# Patient Record
Sex: FEMALE | Race: WHITE | NOT HISPANIC OR LATINO | URBAN - METROPOLITAN AREA
[De-identification: names, ages, dates, MRNs, and addresses within clinical notes are randomized per-mention and may not be internally consistent; named-entity substitution may affect disease eponyms.]

---

## 2019-08-15 PROBLEM — Z00.00 ENCOUNTER FOR PREVENTIVE HEALTH EXAMINATION: Status: ACTIVE | Noted: 2019-08-15

## 2019-08-21 ENCOUNTER — OUTPATIENT (OUTPATIENT)
Dept: OUTPATIENT SERVICES | Facility: HOSPITAL | Age: 29
LOS: 1 days | Discharge: HOME | End: 2019-08-21

## 2019-08-21 ENCOUNTER — APPOINTMENT (OUTPATIENT)
Dept: ANTEPARTUM | Facility: CLINIC | Age: 29
End: 2019-08-21
Payer: MEDICAID

## 2019-08-21 ENCOUNTER — APPOINTMENT (OUTPATIENT)
Dept: OBGYN | Facility: CLINIC | Age: 29
End: 2019-08-21
Payer: MEDICAID

## 2019-08-21 ENCOUNTER — NON-APPOINTMENT (OUTPATIENT)
Age: 29
End: 2019-08-21

## 2019-08-21 ENCOUNTER — RESULT CHARGE (OUTPATIENT)
Age: 29
End: 2019-08-21

## 2019-08-21 VITALS
WEIGHT: 176 LBS | BODY MASS INDEX: 27.95 KG/M2 | SYSTOLIC BLOOD PRESSURE: 110 MMHG | DIASTOLIC BLOOD PRESSURE: 76 MMHG | HEIGHT: 66.54 IN

## 2019-08-21 DIAGNOSIS — O40.9XX0 POLYHYDRAMNIOS, UNSPECIFIED TRIMESTER, NOT APPLICABLE OR UNSPECIFIED: ICD-10-CM

## 2019-08-21 DIAGNOSIS — O36.60X0 MATERNAL CARE FOR EXCESSIVE FETAL GROWTH, UNSPECIFIED TRIMESTER, NOT APPLICABLE OR UNSPECIFIED: ICD-10-CM

## 2019-08-21 DIAGNOSIS — Z34.90 ENCOUNTER FOR SUPERVISION OF NORMAL PREGNANCY, UNSPECIFIED, UNSPECIFIED TRIMESTER: ICD-10-CM

## 2019-08-21 LAB
BILIRUB UR QL STRIP: NEGATIVE
CLARITY UR: CLEAR
COLLECTION METHOD: NORMAL
GLUCOSE UR-MCNC: NEGATIVE
HCG UR QL: NEGATIVE EU/DL
HGB UR QL STRIP.AUTO: NEGATIVE
KETONES UR-MCNC: NEGATIVE
LEUKOCYTE ESTERASE UR QL STRIP: NEGATIVE
NITRITE UR QL STRIP: NEGATIVE
PH UR STRIP: 6.5
PROT UR STRIP-MCNC: NORMAL
SP GR UR STRIP: 1.01

## 2019-08-21 PROCEDURE — 76816 OB US FOLLOW-UP PER FETUS: CPT | Mod: 26

## 2019-08-21 PROCEDURE — 99204 OFFICE O/P NEW MOD 45 MIN: CPT

## 2019-08-21 PROCEDURE — 99212 OFFICE O/P EST SF 10 MIN: CPT | Mod: 25

## 2019-08-21 NOTE — DISCUSSION/SUMMARY
[FreeTextEntry1] : Late registrant with a large baby and polyhydramnios at term. Delivery is advised.

## 2019-08-22 ENCOUNTER — INPATIENT (INPATIENT)
Facility: HOSPITAL | Age: 29
LOS: 2 days | Discharge: HOME | End: 2019-08-25
Attending: OBSTETRICS & GYNECOLOGY | Admitting: OBSTETRICS & GYNECOLOGY
Payer: MEDICAID

## 2019-08-22 VITALS — TEMPERATURE: 99 F

## 2019-08-22 DIAGNOSIS — Z3A.39 39 WEEKS GESTATION OF PREGNANCY: ICD-10-CM

## 2019-08-22 DIAGNOSIS — Z36.89 ENCOUNTER FOR OTHER SPECIFIED ANTENATAL SCREENING: ICD-10-CM

## 2019-08-22 DIAGNOSIS — O36.60X0 MATERNAL CARE FOR EXCESSIVE FETAL GROWTH, UNSPECIFIED TRIMESTER, NOT APPLICABLE OR UNSPECIFIED: ICD-10-CM

## 2019-08-22 DIAGNOSIS — Z34.90 ENCOUNTER FOR SUPERVISION OF NORMAL PREGNANCY, UNSPECIFIED, UNSPECIFIED TRIMESTER: ICD-10-CM

## 2019-08-22 DIAGNOSIS — O40.9XX0 POLYHYDRAMNIOS, UNSPECIFIED TRIMESTER, NOT APPLICABLE OR UNSPECIFIED: ICD-10-CM

## 2019-08-22 LAB
AMPHET UR-MCNC: NEGATIVE — SIGNIFICANT CHANGE UP
APPEARANCE UR: ABNORMAL
BACTERIA # UR AUTO: ABNORMAL
BARBITURATES UR SCN-MCNC: NEGATIVE — SIGNIFICANT CHANGE UP
BASOPHILS # BLD AUTO: 0.04 K/UL — SIGNIFICANT CHANGE UP (ref 0–0.2)
BASOPHILS NFR BLD AUTO: 0.3 % — SIGNIFICANT CHANGE UP (ref 0–1)
BENZODIAZ UR-MCNC: NEGATIVE — SIGNIFICANT CHANGE UP
BILIRUB UR-MCNC: NEGATIVE — SIGNIFICANT CHANGE UP
BLD GP AB SCN SERPL QL: SIGNIFICANT CHANGE UP
BUPRENORPHINE SCREEN, URINE RESULT: NEGATIVE — SIGNIFICANT CHANGE UP
C TRACH RRNA SPEC QL NAA+PROBE: NOT DETECTED
COCAINE METAB.OTHER UR-MCNC: NEGATIVE — SIGNIFICANT CHANGE UP
COLOR SPEC: YELLOW — SIGNIFICANT CHANGE UP
DIFF PNL FLD: NEGATIVE — SIGNIFICANT CHANGE UP
EOSINOPHIL # BLD AUTO: 0.02 K/UL — SIGNIFICANT CHANGE UP (ref 0–0.7)
EOSINOPHIL NFR BLD AUTO: 0.2 % — SIGNIFICANT CHANGE UP (ref 0–8)
EPI CELLS # UR: >27 /HPF — HIGH (ref 0–5)
ESTIMATED AVERAGE GLUCOSE: 103 MG/DL — SIGNIFICANT CHANGE UP (ref 68–114)
GLUCOSE BLDC GLUCOMTR-MCNC: 119 MG/DL — HIGH (ref 70–99)
GLUCOSE UR QL: NEGATIVE — SIGNIFICANT CHANGE UP
HBA1C BLD-MCNC: 5.2 % — SIGNIFICANT CHANGE UP (ref 4–5.6)
HCT VFR BLD CALC: 32.3 % — LOW (ref 37–47)
HGB BLD-MCNC: 10.1 G/DL — LOW (ref 12–16)
HYALINE CASTS # UR AUTO: 17 /LPF — HIGH (ref 0–7)
IMM GRANULOCYTES NFR BLD AUTO: 0.8 % — HIGH (ref 0.1–0.3)
KETONES UR-MCNC: SIGNIFICANT CHANGE UP
L&D DRUG SCREEN, URINE: SIGNIFICANT CHANGE UP
LEUKOCYTE ESTERASE UR-ACNC: ABNORMAL
LYMPHOCYTES # BLD AUTO: 2.89 K/UL — SIGNIFICANT CHANGE UP (ref 1.2–3.4)
LYMPHOCYTES # BLD AUTO: 21.8 % — SIGNIFICANT CHANGE UP (ref 20.5–51.1)
MCHC RBC-ENTMCNC: 22.7 PG — LOW (ref 27–31)
MCHC RBC-ENTMCNC: 31.3 G/DL — LOW (ref 32–37)
MCV RBC AUTO: 72.7 FL — LOW (ref 81–99)
METHADONE UR-MCNC: NEGATIVE — SIGNIFICANT CHANGE UP
MONOCYTES # BLD AUTO: 0.74 K/UL — HIGH (ref 0.1–0.6)
MONOCYTES NFR BLD AUTO: 5.6 % — SIGNIFICANT CHANGE UP (ref 1.7–9.3)
N GONORRHOEA RRNA SPEC QL NAA+PROBE: NOT DETECTED
NEUTROPHILS # BLD AUTO: 9.45 K/UL — HIGH (ref 1.4–6.5)
NEUTROPHILS NFR BLD AUTO: 71.3 % — SIGNIFICANT CHANGE UP (ref 42.2–75.2)
NITRITE UR-MCNC: NEGATIVE — SIGNIFICANT CHANGE UP
NRBC # BLD: 0 /100 WBCS — SIGNIFICANT CHANGE UP (ref 0–0)
OPIATES UR-MCNC: NEGATIVE — SIGNIFICANT CHANGE UP
OXYCODONE UR-MCNC: NEGATIVE — SIGNIFICANT CHANGE UP
PCP UR-MCNC: NEGATIVE — SIGNIFICANT CHANGE UP
PH UR: 6 — SIGNIFICANT CHANGE UP (ref 5–8)
PLATELET # BLD AUTO: 311 K/UL — SIGNIFICANT CHANGE UP (ref 130–400)
PRENATAL SYPHILIS TEST: SIGNIFICANT CHANGE UP
PROPOXYPHENE QUALITATIVE URINE RESULT: NEGATIVE — SIGNIFICANT CHANGE UP
PROT UR-MCNC: ABNORMAL
RBC # BLD: 4.44 M/UL — SIGNIFICANT CHANGE UP (ref 4.2–5.4)
RBC # FLD: 18.7 % — HIGH (ref 11.5–14.5)
RBC CASTS # UR COMP ASSIST: 3 /HPF — SIGNIFICANT CHANGE UP (ref 0–4)
SOURCE AMPLIFICATION: NORMAL
SP GR SPEC: 1.02 — SIGNIFICANT CHANGE UP (ref 1.01–1.02)
UROBILINOGEN FLD QL: SIGNIFICANT CHANGE UP
WBC # BLD: 13.24 K/UL — HIGH (ref 4.8–10.8)
WBC # FLD AUTO: 13.24 K/UL — HIGH (ref 4.8–10.8)
WBC UR QL: 14 /HPF — HIGH (ref 0–5)

## 2019-08-22 RX ORDER — OXYTOCIN 10 UNIT/ML
41.67 VIAL (ML) INJECTION
Qty: 20 | Refills: 0 | Status: DISCONTINUED | OUTPATIENT
Start: 2019-08-22 | End: 2019-08-25

## 2019-08-22 RX ORDER — SODIUM CHLORIDE 9 MG/ML
1000 INJECTION, SOLUTION INTRAVENOUS
Refills: 0 | Status: DISCONTINUED | OUTPATIENT
Start: 2019-08-22 | End: 2019-08-23

## 2019-08-22 RX ORDER — OXYTOCIN 10 UNIT/ML
2 VIAL (ML) INJECTION
Qty: 30 | Refills: 0 | Status: DISCONTINUED | OUTPATIENT
Start: 2019-08-22 | End: 2019-08-25

## 2019-08-22 RX ADMIN — Medication 2 MILLIUNIT(S)/MIN: at 16:32

## 2019-08-22 NOTE — PROGRESS NOTE ADULT - ASSESSMENT
A/P:   30yo  @40wks, GBS unknown, late transfer from [place], IOL for polyhydramnios and macrosomia, for induction of labor, on pitocin.  -continue pitocin  -pain management prn  -cont efm/toco  -f/u pending labs (varicella, rubella, rubeola, quantiferon, HIV, HepBsAg)  -cont to monitor vitals  -cont iv hydration    Will make Dr. Rae and Dr. Gallo aware.

## 2019-08-22 NOTE — OB PROVIDER H&P - NS_OBGYNHISTORY_OBGYN_ALL_OB_FT
OB Hx   FT , female 3450g, no complications    GYN Hx  No Hx of ovarian cysts, fibroids, STIs, abnormal papsmears.

## 2019-08-22 NOTE — OB PROVIDER H&P - HISTORY OF PRESENT ILLNESS
28yo  @40wks, ROQUE 2019 by LMP, late transfer from [place]. Patient was seen in office for the first time yesterday, official sonogram indicated polyhydramnios (MVP 112mm). Patient denies CTX, LOF, VB. Reports good FM. Denies complications during this pregnancy. Pelvic exam yesterday in office was /-3. 30yo  @40wks, ROQUE 2019 by LMP, late transfer from [place]. She moved 1 month ago, reports compliance with prenatal care. Patient was seen in office for the first time yesterday, official sonogram indicated polyhydramnios (MVP 112mm). Patient denies CTX, LOF, VB. Reports good FM. Denies fever, chills, n/v, constipation, diarrhea, dysuria. Denies complications during this pregnancy. Pelvic exam yesterday in office was /-3. 30yo  @40wks, ROQUE 2019 by LMP, late transfer from [place]. She moved 1 month ago, reports compliance with prenatal care. Patient was seen in office for the first time yesterday, official sonogram indicated polyhydramnios (MVP 112mm) and macrosomia. Patient denies CTX, LOF, VB. Reports good FM. Denies fever, chills, n/v, constipation, diarrhea, dysuria. Denies complications during this pregnancy. Pelvic exam yesterday in office was /-3.

## 2019-08-22 NOTE — OB PROVIDER H&P - NSHPPHYSICALEXAM_GEN_ALL_CORE
Vital Signs Last 24 Hrs  T(C): 37 (22 Aug 2019 15:18), Max: 37 (22 Aug 2019 15:00)  T(F): 98.6 (22 Aug 2019 15:18), Max: 98.6 (22 Aug 2019 15:00)  HR: 100 (22 Aug 2019 15:18) (100 - 100)  BP: 105/71 (22 Aug 2019 15:18) (105/71 - 105/71)    FS: 119 mg/dL  Udip  EFM: 140/mod maria del carmen/+accels  [place]: q3 mins  Gen: NAD  Heart:   Lungs:   Abd: soft, nontender, gravid, palpable contractions  SVE:   Sono: Vital Signs Last 24 Hrs  T(C): 37 (22 Aug 2019 15:18), Max: 37 (22 Aug 2019 15:00)  T(F): 98.6 (22 Aug 2019 15:18), Max: 98.6 (22 Aug 2019 15:00)  HR: 100 (22 Aug 2019 15:18) (100 - 100)  BP: 105/71 (22 Aug 2019 15:18) (105/71 - 105/71)    FS: 119 mg/dL  Udip  EFM: 140/mod maria [place] carmen/+accels  [place]: q3 mins  Gen: NAD  Heart: S1S2, RRR  Lungs: CTAB  Abd: soft, nontender, gravid, palpable contractions  SVE:   Sono: post placenta, cephalic Vital Signs Last 24 Hrs  T(C): 37 (22 Aug 2019 15:18), Max: 37 (22 Aug 2019 15:00)  T(F): 98.6 (22 Aug 2019 15:18), Max: 98.6 (22 Aug 2019 15:00)  HR: 100 (22 Aug 2019 15:18) (100 - 100)  BP: 105/71 (22 Aug 2019 15:18) (105/71 - 105/71)    FS: 119 mg/dL    EFM: 140/mod maria del carmen/+accels  [place]: q3 mins  Gen: NAD  Heart: S1S2, RRR  Lungs: CTAB  Abd: soft, nontender, gravid, palpable contractions  SVE:   Sono: post placenta, cephalic Vital Signs Last 24 Hrs  T(C): 37 (22 Aug 2019 15:18), Max: 37 (22 Aug 2019 15:00)  T(F): 98.6 (22 Aug 2019 15:18), Max: 98.6 (22 Aug 2019 15:00)  HR: 100 (22 Aug 2019 15:18) (100 - 100)  BP: 105/71 (22 Aug 2019 15:18) (105/71 - 105/71)    FS: 119 mg/dL  Udip: trace ket, 30 protein, mod leuks  EFM: 140/mod maria [place] carmen/+accels  [place]: q3 mins  Gen: NAD  Heart: S1S2, RRR  Lungs: CTAB  Abd: soft, nontender, gravid, palpable contractions  SVE: 1-2/50/-2 by Dr. Hwang  Sono: post placenta, cephalic

## 2019-08-22 NOTE — PROGRESS NOTE ADULT - SUBJECTIVE AND OBJECTIVE BOX
OBGYN PGY3 Note:     Patient seen and examined at bedside. Comfortable, denies complaints.      T(C): 36.9 (19 @ 15:29), Max: 37 (19 @ 15:00)  HR: 88 (19 @ 23:36) (58 - 100)  BP: 96/50 (19 @ 23:36) (81/50 - 112/78)  RR: 18 (19 @ 23:29) (18 - 18)  SpO2: 99% (19 @ 23:36) (99% - 99%)    EFM: 135/mod/accels+  [place]: q2min  SVE: 3-4/50/-2, AROM, clear     Meds: lactated ringers. 1000 milliLiter(s) IV Continuous <Continuous>  oxytocin Infusion 2 milliUNIT(s)/Min IV Continuous <Continuous> started at 2330, currently at 12mu/min      Labs: HbA1c 5.2; HbsAg, HIV, quantiferon TB, rubella/rubeola/varicella pending                       10.1   13.24 )-----------( 311      ( 22 Aug 2019 16:04 )             32.3         Urinalysis Basic - ( 22 Aug 2019 16:04 )    Color: Yellow / Appearance: Slightly Turbid / S.020 / pH: x  Gluc: x / Ketone: Trace  / Bili: Negative / Urobili: <2 mg/dL   Blood: x / Protein: 30 mg/dL / Nitrite: Negative   Leuk Esterase: Moderate / RBC: 3 /HPF / WBC 14 /HPF   Sq Epi: x / Non Sq Epi: >27 /HPF / Bacteria: Moderate         Prenatal Syphilis Test: Nonreact (19 @ 16:04)  ABO RH Interpretation: B POS (19 @ 16:04)

## 2019-08-22 NOTE — CHART NOTE - NSCHARTNOTEFT_GEN_A_CORE
Thursday 08/22/2019  21:26    Patient's chart, notes and labs reviewed.  IOL with Oxytocin.                        10.1   13.24 )-----------( 311      ( 22 Aug 2019 16:04 )             32.3   Lab results acceptable for consideration of Neuraxial Nerve Block for Labor Analgesia/Anesthesia.  Pt has not requested for any Labor Analgesia as of this note.    Will remain immediately available.

## 2019-08-22 NOTE — PROGRESS NOTE ADULT - ASSESSMENT
28yo  at 40w GA, GBS unknown, late transfer from [place], IOL for polyhydramnios on pitocin   - continuous EFM/toco  - clear liquids  - pain mgmt prn   - IV hydration  - c/w pit  - anticipate    - f/u pending labs    Dr. Gallo aware.

## 2019-08-22 NOTE — OB PROVIDER H&P - ASSESSMENT
28yo  @40wks, GBS unknown, late transfer from [place], IOL for polyhydramnios, in early labor    -admit to L&D  -admission labs  -continuous EFM/toco  -pitocin for induction  -monitor vitals  -pain management PRN  -anticipate      and Dr. Gallo to be made aware 30yo  @40wks, GBS unknown, late transfer from [place], IOL for polyhydramnios and macrosomia, for induction of labor    -admit to L&D  -admission labs  -continuous EFM/toco  -pitocin for induction  -monitor vitals  -pain management PRN  -anticipate      and Dr. Gallo to be made aware

## 2019-08-22 NOTE — PROGRESS NOTE ADULT - SUBJECTIVE AND OBJECTIVE BOX
PGY1 Note    Patient seen at bedside for evaluation of labor progression, doing well, no complaints.     T(F): 98.5 (19 @ 15:29), Max: 98.6 (19 @ 15:00)  HR: 58 (19 @ 20:33) (58 - 100)  BP: 83/52 (19 @ 20:33) (81/50 - 112/78)  RR: 18 (19 @ 15:29) (18 - 18)    EFM: 130/mod/+accels; cat 1  TOCO: q2mins	  SVE: deferred, 1-2/50/-2 @1614    Medications:    oxytocin Infusion: 2 mL/Hr (19 @ 16:27)      Labs:                        10.1   13.24 )-----------( 311      ( 22 Aug 2019 16:04 )             32.3           ABO RH Interpretation: B POS (19 @ 16:04)    Antibody Screen: NEG (19 @ 16:04)    Urinalysis Basic - ( 22 Aug 2019 16:04 )    Color: Yellow / Appearance: Slightly Turbid / S.020 / pH: x  Gluc: x / Ketone: Trace  / Bili: Negative / Urobili: <2 mg/dL   Blood: x / Protein: 30 mg/dL / Nitrite: Negative   Leuk Esterase: Moderate / RBC: 3 /HPF / WBC 14 /HPF   Sq Epi: x / Non Sq Epi: >27 /HPF / Bacteria: Moderate      L&amp;D Drug Screen, Urine: Done (19 @ 16:04)    Prenatal Syphilis Test: Nonreact (19 @ 16:04) PGY1 Note    Patient seen at bedside for evaluation of labor progression, doing well, no complaints.     T(F): 98.5 (19 @ 15:29), Max: 98.6 (19 @ 15:00)  HR: 58 (19 @ 20:33) (58 - 100)  BP: 83/52 (19 @ 20:33) (81/50 - 112/78)  RR: 18 (19 @ 15:29) (18 - 18)    EFM: 130/mod/+accels; cat 1  TOCO: q2mins	  SVE: deferred, 1-2/50/-2 @1614    Medications:    pitocin started at 1630, currently at 10mu/min      Labs:                        10.1   13.24 )-----------( 311      ( 22 Aug 2019 16:04 )             32.3           ABO RH Interpretation: B POS (19 @ 16:04)    Antibody Screen: NEG (19 @ 16:04)    Urinalysis Basic - ( 22 Aug 2019 16:04 )    Color: Yellow / Appearance: Slightly Turbid / S.020 / pH: x  Gluc: x / Ketone: Trace  / Bili: Negative / Urobili: <2 mg/dL   Blood: x / Protein: 30 mg/dL / Nitrite: Negative   Leuk Esterase: Moderate / RBC: 3 /HPF / WBC 14 /HPF   Sq Epi: x / Non Sq Epi: >27 /HPF / Bacteria: Moderate      L&amp;D Drug Screen, Urine: Done (19 @ 16:04)    Prenatal Syphilis Test: Nonreact (19 @ 16:04)

## 2019-08-22 NOTE — CHART NOTE - NSCHARTNOTEFT_GEN_A_CORE
Thursday 08/22/2019  23:28    Pt's progress noted.  Pt still has not requested any Labor Analgesia.    Will continue to remain immediately available.

## 2019-08-23 LAB
HBV SURFACE AG SER-ACNC: SIGNIFICANT CHANGE UP
HIV 1+2 AB+HIV1 P24 AG SERPL QL IA: SIGNIFICANT CHANGE UP
MEV IGG SER-ACNC: 5.6 AU/ML — SIGNIFICANT CHANGE UP
MEV IGG+IGM SER-IMP: NEGATIVE — SIGNIFICANT CHANGE UP
RUBV IGG SER-ACNC: 27.5 INDEX — SIGNIFICANT CHANGE UP
RUBV IGG SER-IMP: POSITIVE — SIGNIFICANT CHANGE UP
VZV IGG FLD QL IA: 263.7 INDEX — SIGNIFICANT CHANGE UP
VZV IGG FLD QL IA: POSITIVE — SIGNIFICANT CHANGE UP

## 2019-08-23 PROCEDURE — 59409 OBSTETRICAL CARE: CPT | Mod: U9

## 2019-08-23 RX ORDER — DIBUCAINE 1 %
1 OINTMENT (GRAM) RECTAL EVERY 6 HOURS
Refills: 0 | Status: DISCONTINUED | OUTPATIENT
Start: 2019-08-23 | End: 2019-08-25

## 2019-08-23 RX ORDER — DOCUSATE SODIUM 100 MG
100 CAPSULE ORAL
Refills: 0 | Status: DISCONTINUED | OUTPATIENT
Start: 2019-08-23 | End: 2019-08-25

## 2019-08-23 RX ORDER — HYDROCORTISONE 1 %
1 OINTMENT (GRAM) TOPICAL EVERY 6 HOURS
Refills: 0 | Status: DISCONTINUED | OUTPATIENT
Start: 2019-08-23 | End: 2019-08-25

## 2019-08-23 RX ORDER — AER TRAVELER 0.5 G/1
1 SOLUTION RECTAL; TOPICAL EVERY 4 HOURS
Refills: 0 | Status: DISCONTINUED | OUTPATIENT
Start: 2019-08-23 | End: 2019-08-25

## 2019-08-23 RX ORDER — OXYTOCIN 10 UNIT/ML
333.33 VIAL (ML) INJECTION
Qty: 20 | Refills: 0 | Status: DISCONTINUED | OUTPATIENT
Start: 2019-08-23 | End: 2019-08-25

## 2019-08-23 RX ORDER — DIPHENHYDRAMINE HCL 50 MG
25 CAPSULE ORAL EVERY 6 HOURS
Refills: 0 | Status: DISCONTINUED | OUTPATIENT
Start: 2019-08-23 | End: 2019-08-25

## 2019-08-23 RX ORDER — ONDANSETRON 8 MG/1
4 TABLET, FILM COATED ORAL EVERY 6 HOURS
Refills: 0 | Status: DISCONTINUED | OUTPATIENT
Start: 2019-08-23 | End: 2019-08-25

## 2019-08-23 RX ORDER — OXYCODONE HYDROCHLORIDE 5 MG/1
5 TABLET ORAL ONCE
Refills: 0 | Status: DISCONTINUED | OUTPATIENT
Start: 2019-08-23 | End: 2019-08-25

## 2019-08-23 RX ORDER — SODIUM CHLORIDE 9 MG/ML
3 INJECTION INTRAMUSCULAR; INTRAVENOUS; SUBCUTANEOUS EVERY 8 HOURS
Refills: 0 | Status: DISCONTINUED | OUTPATIENT
Start: 2019-08-23 | End: 2019-08-24

## 2019-08-23 RX ORDER — NALOXONE HYDROCHLORIDE 4 MG/.1ML
0.1 SPRAY NASAL
Refills: 0 | Status: DISCONTINUED | OUTPATIENT
Start: 2019-08-23 | End: 2019-08-25

## 2019-08-23 RX ORDER — LANOLIN
1 OINTMENT (GRAM) TOPICAL EVERY 6 HOURS
Refills: 0 | Status: DISCONTINUED | OUTPATIENT
Start: 2019-08-23 | End: 2019-08-25

## 2019-08-23 RX ORDER — ACETAMINOPHEN 500 MG
975 TABLET ORAL
Refills: 0 | Status: DISCONTINUED | OUTPATIENT
Start: 2019-08-23 | End: 2019-08-25

## 2019-08-23 RX ORDER — IBUPROFEN 200 MG
600 TABLET ORAL EVERY 6 HOURS
Refills: 0 | Status: DISCONTINUED | OUTPATIENT
Start: 2019-08-23 | End: 2019-08-25

## 2019-08-23 RX ORDER — KETOROLAC TROMETHAMINE 30 MG/ML
30 SYRINGE (ML) INJECTION ONCE
Refills: 0 | Status: DISCONTINUED | OUTPATIENT
Start: 2019-08-23 | End: 2019-08-25

## 2019-08-23 RX ORDER — BENZOCAINE 10 %
1 GEL (GRAM) MUCOUS MEMBRANE EVERY 6 HOURS
Refills: 0 | Status: DISCONTINUED | OUTPATIENT
Start: 2019-08-23 | End: 2019-08-25

## 2019-08-23 RX ORDER — GLYCERIN ADULT
1 SUPPOSITORY, RECTAL RECTAL AT BEDTIME
Refills: 0 | Status: DISCONTINUED | OUTPATIENT
Start: 2019-08-23 | End: 2019-08-25

## 2019-08-23 RX ORDER — IBUPROFEN 200 MG
600 TABLET ORAL EVERY 6 HOURS
Refills: 0 | Status: COMPLETED | OUTPATIENT
Start: 2019-08-23 | End: 2020-07-21

## 2019-08-23 RX ORDER — OXYCODONE HYDROCHLORIDE 5 MG/1
5 TABLET ORAL
Refills: 0 | Status: DISCONTINUED | OUTPATIENT
Start: 2019-08-23 | End: 2019-08-25

## 2019-08-23 RX ORDER — PRAMOXINE HYDROCHLORIDE 150 MG/15G
1 AEROSOL, FOAM RECTAL EVERY 4 HOURS
Refills: 0 | Status: DISCONTINUED | OUTPATIENT
Start: 2019-08-23 | End: 2019-08-25

## 2019-08-23 RX ORDER — SIMETHICONE 80 MG/1
80 TABLET, CHEWABLE ORAL EVERY 4 HOURS
Refills: 0 | Status: DISCONTINUED | OUTPATIENT
Start: 2019-08-23 | End: 2019-08-25

## 2019-08-23 RX ORDER — MAGNESIUM HYDROXIDE 400 MG/1
30 TABLET, CHEWABLE ORAL
Refills: 0 | Status: DISCONTINUED | OUTPATIENT
Start: 2019-08-23 | End: 2019-08-25

## 2019-08-23 RX ADMIN — SODIUM CHLORIDE 3 MILLILITER(S): 9 INJECTION INTRAMUSCULAR; INTRAVENOUS; SUBCUTANEOUS at 14:13

## 2019-08-23 NOTE — PROGRESS NOTE ADULT - SUBJECTIVE AND OBJECTIVE BOX
PGY1 Note    Patient seen at bedside for evaluation of labor progression, doing well, no complaints.     T(F): 98.24 (19 @ 02:54), Max: 98.6 (19 @ 15:00)  HR: 72 (19 @ 03:21) (56 - 116)  BP: 101/69 (19 @ 03:23) (81/50 - 126/72)  RR: 18 (19 @ 02:54) (18 - 18)  SpO2: 99% (19 @ 03:21) (94% - 100%)    EFM: 130/mod/+accels, cat 1  TOCO: q2 mins  SVE: deferred, 60/-2 vtx AROM clear @0141    Medications:    pitocin started at 1630, now at 14mu/min      Labs:                        10.1   13.24 )-----------( 311      ( 22 Aug 2019 16:04 )             32.3           ABO RH Interpretation: B POS (19 @ 16:04)    Antibody Screen: NEG (19 @ 16:04)    Urinalysis Basic - ( 22 Aug 2019 16:04 )    Color: Yellow / Appearance: Slightly Turbid / S.020 / pH: x  Gluc: x / Ketone: Trace  / Bili: Negative / Urobili: <2 mg/dL   Blood: x / Protein: 30 mg/dL / Nitrite: Negative   Leuk Esterase: Moderate / RBC: 3 /HPF / WBC 14 /HPF   Sq Epi: x / Non Sq Epi: >27 /HPF / Bacteria: Moderate      L&amp;D Drug Screen, Urine: Done (19 @ 16:04)    Prenatal Syphilis Test: Nonreact (19 @ 16:04)

## 2019-08-23 NOTE — OB PROVIDER DELIVERY SUMMARY - NSPROVIDERDELIVERYNOTE_OBGYN_ALL_OB_FT
Patient was fully dilated and pushed effectively to deliver fetus is OA presentation with restitution to the right. No nuchal cord was palpated. Anterior shoulder was delivered followed by posterior shoulder followed by rest of the body atraumatically. Cord was clamped and cut and cord blood was collected. Infant handed to mother. Postpartum pitocin was started. Fundal massage was done. Placenta was delivered intact. Good hemostasis noted. No lacerations were noted. Patient tolerated it well.     Dr. Gallo was present for delivery

## 2019-08-23 NOTE — PROGRESS NOTE ADULT - SUBJECTIVE AND OBJECTIVE BOX
PGY1 Note    Patient seen at bedside for evaluation of labor progression, doing well, no complaints.     T(F): 98.24 (19 @ 02:54), Max: 98.6 (19 @ 15:00)  HR: 76 (19 @ 05:21) (56 - 118)  BP: 97/52 (19 @ 05:17) (81/50 - 126/72)  RR: 18 (19 @ 03:23) (18 - 18)  SpO2: 99% (19 @ 05:16) (94% - 100%)    EFM: 130/mod/+accels; cat 1  TOCO: q2-3 mins  SVE:deferred, 60/-2    Medications:    oxytocin Infusion: 2 mL/Hr (19 @ 16:27), currently at 14u/min      Labs:                        10.1   13.24 )-----------( 311      ( 22 Aug 2019 16:04 )             32.3           ABO RH Interpretation: B POS (19 @ 16:04)    Antibody Screen: NEG (19 @ 16:04)    Urinalysis Basic - ( 22 Aug 2019 16:04 )    Color: Yellow / Appearance: Slightly Turbid / S.020 / pH: x  Gluc: x / Ketone: Trace  / Bili: Negative / Urobili: <2 mg/dL   Blood: x / Protein: 30 mg/dL / Nitrite: Negative   Leuk Esterase: Moderate / RBC: 3 /HPF / WBC 14 /HPF   Sq Epi: x / Non Sq Epi: >27 /HPF / Bacteria: Moderate      L&amp;D Drug Screen, Urine: Done (19 @ 16:04)    Prenatal Syphilis Test: Nonreact (19 @ 16:04)

## 2019-08-23 NOTE — PROGRESS NOTE ADULT - ASSESSMENT
A/P:   30yo  @40w1d, GBS unknown, late transfer from [place], IOL for polyhydramnios and macrosomia, for induction of labor, on pitocin.  -continue pitocin  -pain management prn  -cont efm/toco  -f/u pending labs (varicella, rubella, rubeola, quantiferon, HIV, HepBsAg)  -cont to monitor vitals  -cont iv hydration    Will make Dr. Rae and Dr. Gallo aware. A/P:   30yo  @40w1d, GBS unknown, late transfer from [place], IOL for polyhydramnios and macrosomia, for induction of labor, on pitocin.  -continue pitocin  -epiidural for pain management  -cont efm/toco  -f/u pending labs (varicella, rubella, rubeola, quantiferon, HIV, HepBsAg)  -cont to monitor vitals  -cont iv hydration    Will make Dr. Rae and Dr. Gallo aware.

## 2019-08-23 NOTE — PROCEDURE NOTE - SUPERVISORY STATEMENT
Epidural infusion:  0.1% Bupivacaine PLAIN  Rate:  15 ml/hr  Motor intact, able to flex b/l knees w/o any difficulty

## 2019-08-23 NOTE — CHART NOTE - NSCHARTNOTEFT_GEN_A_CORE
Friday 08/23/2019  01:07 AM    Pt's progress noted.  Oxytocin "12".  Pt still has not requested any Neuraxial Labor Analgesia.    Will continue to remain immediately available.

## 2019-08-23 NOTE — PROGRESS NOTE ADULT - ASSESSMENT
A/P:   30yo  @40w1d, GBS unknown, late transfer from [place], IOL for polyhydramnios and macrosomia, for induction of labor, on pitocin.  -continue pitocin  -epiidural for pain management  -cont efm/toco  -f/u pending labs (varicella, rubella, rubeola, quantiferon, HIV, HepBsAg)  -cont to monitor vitals  -cont iv hydration    Will make Dr. Rae and Dr. Gallo aware.

## 2019-08-24 LAB
BASOPHILS # BLD AUTO: 0.05 K/UL — SIGNIFICANT CHANGE UP (ref 0–0.2)
BASOPHILS NFR BLD AUTO: 0.3 % — SIGNIFICANT CHANGE UP (ref 0–1)
EOSINOPHIL # BLD AUTO: 0.11 K/UL — SIGNIFICANT CHANGE UP (ref 0–0.7)
EOSINOPHIL NFR BLD AUTO: 0.7 % — SIGNIFICANT CHANGE UP (ref 0–8)
HCT VFR BLD CALC: 33.9 % — LOW (ref 37–47)
HGB BLD-MCNC: 10.3 G/DL — LOW (ref 12–16)
IMM GRANULOCYTES NFR BLD AUTO: 0.7 % — HIGH (ref 0.1–0.3)
LYMPHOCYTES # BLD AUTO: 20.2 % — LOW (ref 20.5–51.1)
LYMPHOCYTES # BLD AUTO: 3.33 K/UL — SIGNIFICANT CHANGE UP (ref 1.2–3.4)
MCHC RBC-ENTMCNC: 22.5 PG — LOW (ref 27–31)
MCHC RBC-ENTMCNC: 30.4 G/DL — LOW (ref 32–37)
MCV RBC AUTO: 74.2 FL — LOW (ref 81–99)
MONOCYTES # BLD AUTO: 0.85 K/UL — HIGH (ref 0.1–0.6)
MONOCYTES NFR BLD AUTO: 5.2 % — SIGNIFICANT CHANGE UP (ref 1.7–9.3)
NEUTROPHILS # BLD AUTO: 12.02 K/UL — HIGH (ref 1.4–6.5)
NEUTROPHILS NFR BLD AUTO: 72.9 % — SIGNIFICANT CHANGE UP (ref 42.2–75.2)
NRBC # BLD: 0 /100 WBCS — SIGNIFICANT CHANGE UP (ref 0–0)
PLATELET # BLD AUTO: 316 K/UL — SIGNIFICANT CHANGE UP (ref 130–400)
RBC # BLD: 4.57 M/UL — SIGNIFICANT CHANGE UP (ref 4.2–5.4)
RBC # FLD: 19 % — HIGH (ref 11.5–14.5)
WBC # BLD: 16.48 K/UL — HIGH (ref 4.8–10.8)
WBC # FLD AUTO: 16.48 K/UL — HIGH (ref 4.8–10.8)

## 2019-08-24 PROCEDURE — 99231 SBSQ HOSP IP/OBS SF/LOW 25: CPT

## 2019-08-24 RX ADMIN — SODIUM CHLORIDE 3 MILLILITER(S): 9 INJECTION INTRAMUSCULAR; INTRAVENOUS; SUBCUTANEOUS at 13:04

## 2019-08-24 RX ADMIN — SODIUM CHLORIDE 3 MILLILITER(S): 9 INJECTION INTRAMUSCULAR; INTRAVENOUS; SUBCUTANEOUS at 00:06

## 2019-08-24 RX ADMIN — Medication 1 APPLICATION(S): at 00:04

## 2019-08-24 RX ADMIN — Medication 975 MILLIGRAM(S): at 17:18

## 2019-08-24 RX ADMIN — SODIUM CHLORIDE 3 MILLILITER(S): 9 INJECTION INTRAMUSCULAR; INTRAVENOUS; SUBCUTANEOUS at 06:59

## 2019-08-24 RX ADMIN — Medication 975 MILLIGRAM(S): at 17:50

## 2019-08-24 NOTE — PROGRESS NOTE ADULT - ASSESSMENT
28yo P2, s/p , PPD#1, late transfer from [place], recovering well    - pain management PRN  - f/u quantiferon  -cont to monitor vitals  - encourage ambulation, PO hydration  -regular diet  -f/u social work consult, patient is homeless    Dr. Milligan and Dr. Noel aware 28yo P2, s/p , PPD#1, late transfer from [place], recovering well    - pain management PRN  - f/u quantiferon  -cont to monitor vitals  - encourage ambulation, PO hydration  -regular diet  -f/u social work consult, patient is homeless    Dr. Milligan and Dr. Noel aware.

## 2019-08-24 NOTE — PROGRESS NOTE ADULT - SUBJECTIVE AND OBJECTIVE BOX
PGY 1Note:  [place]  #[id number]  Pt doing well, pain well controlled. Denies heavy vaginal bleeding. No overnight events, no acute complaints.    Ambulating: Yes  Voiding: Yes  Flatus: Yes  Breast or bottle feeding: Bottle  Diet: Regular    PAST MEDICAL & SURGICAL HISTORY:  No pertinent past medical history  No significant past surgical history      Physical Exam  Vital Signs Last 24 Hrs  T(F): 96.9 (24 Aug 2019 08:09), Max: 100.4 (23 Aug 2019 10:15)  HR: 63 (24 Aug 2019 08:09) (63 - 93)  BP: 87/50 (24 Aug 2019 08:09) (87/50 - 129/62)  RR: 18 (24 Aug 2019 08:09) (18 - 20)    Gen: AAOx3, NAD  Heart: S1S2, RRR  Lungs: CTAB  Abd: Soft, nontender, nondistended, BS+  Fundus: Firm, nontender, below the umbilicus  Lochia: Normal  Ext: No calf tenderness, no swelling    Labs:                        10.1   13.24 )-----------( 311      ( 22 Aug 2019 16:04 )             32.3     MEDICATIONS  (STANDING):  acetaminophen   Tablet .. 975 milliGRAM(s) Oral <User Schedule>  ibuprofen  Tablet. 600 milliGRAM(s) Oral every 6 hours  ketorolac   Injectable 30 milliGRAM(s) IV Push once  measles/mumps/rubella Vaccine 0.5 milliLiter(s) SubCutaneous once

## 2019-08-25 ENCOUNTER — TRANSCRIPTION ENCOUNTER (OUTPATIENT)
Age: 29
End: 2019-08-25

## 2019-08-25 VITALS
RESPIRATION RATE: 19 BRPM | HEART RATE: 81 BPM | SYSTOLIC BLOOD PRESSURE: 121 MMHG | DIASTOLIC BLOOD PRESSURE: 79 MMHG | TEMPERATURE: 98 F

## 2019-08-25 LAB
GP B STREP DNA SPEC QL NAA+PROBE: NORMAL
GP B STREP DNA SPEC QL NAA+PROBE: NOT DETECTED
SOURCE GBS: NORMAL

## 2019-08-25 PROCEDURE — 99238 HOSP IP/OBS DSCHRG MGMT 30/<: CPT

## 2019-08-25 RX ORDER — SIMETHICONE 80 MG/1
1 TABLET, CHEWABLE ORAL
Qty: 0 | Refills: 0 | DISCHARGE
Start: 2019-08-25

## 2019-08-25 RX ORDER — ACETAMINOPHEN 500 MG
3 TABLET ORAL
Qty: 0 | Refills: 0 | DISCHARGE
Start: 2019-08-25

## 2019-08-25 RX ORDER — DOCUSATE SODIUM 100 MG
1 CAPSULE ORAL
Qty: 0 | Refills: 0 | DISCHARGE
Start: 2019-08-25

## 2019-08-25 RX ORDER — IBUPROFEN 200 MG
1 TABLET ORAL
Qty: 0 | Refills: 0 | DISCHARGE
Start: 2019-08-25

## 2019-08-25 RX ADMIN — Medication 600 MILLIGRAM(S): at 10:05

## 2019-08-25 RX ADMIN — Medication 1 TABLET(S): at 12:00

## 2019-08-25 NOTE — DISCHARGE NOTE OB - CARE PROVIDER_API CALL
Kendy Bar)  Obstetrics and Gynecology  [address] [place] [id number]  Phone: [phone]  Fax: [phone]  Follow Up Time:

## 2019-08-25 NOTE — PROGRESS NOTE ADULT - SUBJECTIVE AND OBJECTIVE BOX
PGY 2 Post Partum note    Subjective: Pt seen and examined at bedside. Doing well, pain controlled. Pt is voiding without difficulty, passing flatus and had BM, tolerating regular diet, ambulating, breast feeding. Denies CP, SOB, N/V, LE pain.    Physical exam:    Vital Signs Last 24 Hrs  T(F): 97 (25 Aug 2019 08:14), Max: 98.2 (24 Aug 2019 15:49)  HR: 70 (25 Aug 2019 08:14) (68 - 70)  BP: 112/54 (25 Aug 2019 08:14) (98/57 - 127/62)  RR: 19 (25 Aug 2019 08:14) (18 - 19)    Gen: NAD  CVS: s1s2, rrr  Lungs: ctab, no r/r/w  Abdomen: Soft, appropriately tender, no distension , firm uterine fundus below umbilicus, +BS  Pelvic: Normal lochia rubra noted  Ext: No calf tenderness    Diet: Regular  meds: acetaminophen   Tablet ..   975 milliGRAM(s) Oral (08-24-19 @ 17:18)      LABS:                        10.3   16.48 )-----------( 316      ( 24 Aug 2019 12:17 )             33.9

## 2019-08-25 NOTE — PROGRESS NOTE ADULT - ASSESSMENT
28 yo P2, s/p , PPD2, late transfer from [place], doing well.    - pain management PRN  - c/w regular diet, PO hydration  - monitor vitals, bleeding  - encourage ambulation  - d/c today    Dr. Vicente aware and Dr. Ji to be made aware.

## 2019-08-25 NOTE — DISCHARGE NOTE OB - PATIENT PORTAL LINK FT
You can access the [facility] Portal, offered by [facility], by registering with the following website: [email or web address]

## 2019-08-25 NOTE — DISCHARGE NOTE OB - MEDICATION SUMMARY - MEDICATIONS TO STOP TAKING
I will STOP taking the medications listed below when I get home from the hospital:    PNV Prenatal oral tablet  -- 1 tab(s) by mouth once a day

## 2019-08-25 NOTE — DISCHARGE NOTE OB - HOSPITAL COURSE
08-25-19 @ 08:42    HPI:  28yo  @40wks, ROQUE 2019 by LMP, late transfer from [place]. She moved 1 month ago, reports compliance with prenatal care. Patient was seen in office for the first time yesterday, official sonogram indicated polyhydramnios (MVP 112mm) and macrosomia. Patient denies CTX, LOF, VB. Reports good FM. Denies fever, chills, n/v, constipation, diarrhea, dysuria. Denies complications during this pregnancy. Pelvic exam yesterday in office was /-3. (22 Aug 2019 15:05)      PAST MEDICAL & SURGICAL HISTORY:  No pertinent past medical history  No significant past surgical history      POST PARTUM COURSE:   uncomplicated labor and postpartum course discharged PPD2       LABS:                        10.3   16.48 )-----------( 316      ( 24 Aug 2019 12:17 )             33.9                   Allergies    No Known Allergies    Intolerances

## 2019-08-27 DIAGNOSIS — O40.3XX0 POLYHYDRAMNIOS, THIRD TRIMESTER, NOT APPLICABLE OR UNSPECIFIED: ICD-10-CM

## 2019-08-27 DIAGNOSIS — Z34.90 ENCOUNTER FOR SUPERVISION OF NORMAL PREGNANCY, UNSPECIFIED, UNSPECIFIED TRIMESTER: ICD-10-CM

## 2019-08-27 DIAGNOSIS — O36.63X0 MATERNAL CARE FOR EXCESSIVE FETAL GROWTH, THIRD TRIMESTER, NOT APPLICABLE OR UNSPECIFIED: ICD-10-CM

## 2019-08-27 DIAGNOSIS — O48.0 POST-TERM PREGNANCY: ICD-10-CM

## 2019-08-27 DIAGNOSIS — Z3A.41 41 WEEKS GESTATION OF PREGNANCY: ICD-10-CM

## 2019-08-27 LAB
GAMMA INTERFERON BACKGROUND BLD IA-ACNC: 0.02 IU/ML — SIGNIFICANT CHANGE UP
M TB IFN-G BLD-IMP: NEGATIVE — SIGNIFICANT CHANGE UP
M TB IFN-G CD4+ BCKGRND COR BLD-ACNC: 0 IU/ML — SIGNIFICANT CHANGE UP
M TB IFN-G CD4+CD8+ BCKGRND COR BLD-ACNC: 0 IU/ML — SIGNIFICANT CHANGE UP
QUANT TB PLUS MITOGEN MINUS NIL: 4.76 IU/ML — SIGNIFICANT CHANGE UP

## 2019-08-29 LAB — HPV HIGH+LOW RISK DNA PNL CVX: NOT DETECTED

## 2019-09-20 ENCOUNTER — MESSAGE (OUTPATIENT)
Age: 29
End: 2019-09-20

## 2020-06-11 NOTE — OB RN DELIVERY SUMMARY - BABY A: ID BAND NUMBER, DELIVERY
Dietary Recommendations for  GERD    1. Prefer drinking most of your liquids BEFORE meals; only small sips during meals and during the first couple hours after a meal  2.  Completely avoid soda, coke, mountain dew, red bull and liquor.  3.  Minimize use of wine, beer, coffee, tea. Prefer camomile, mint tea and anise tea.  4.  Completely avoid fried, sauteed and browned food. Your food should be cooked with water and not with oil, butter or margarine. Prefer grilled, baked or steamed foods.  5.  Avoid large meals and overeating  6.  Avoid any of the following foods if it makes YOUR reflux symptoms worse: tomatoes, citrus, onions, garlic, chocolate  7.  Do not exercise after eating      
[id number]

## 2022-11-15 NOTE — PROCEDURAL SAFETY CHECKLIST WITH OR WITHOUT SEDATION - NSPREPROCLOCFT_GEN_ALL_CORE
donta and d Adbry Pregnancy And Lactation Text: It is unknown if this medication will adversely affect pregnancy or breast feeding.

## 2024-11-05 NOTE — OB PROVIDER DELIVERY SUMMARY - NS_BIRTHTRAUMAA_OBGYN_ALL_OB
Dear Riley,     In reviewing your history of the current problem,  I will forward to my staff to cancel this E-Visit and have the other arranged.  We can address this through tel call. I would advise you to call around to other pharmacies to see if anyone has it in stock and we can just change your pharmacy     Teodora Sheffield       None